# Patient Record
Sex: MALE | Race: WHITE | HISPANIC OR LATINO
[De-identification: names, ages, dates, MRNs, and addresses within clinical notes are randomized per-mention and may not be internally consistent; named-entity substitution may affect disease eponyms.]

---

## 2018-05-18 PROBLEM — Z00.00 ENCOUNTER FOR PREVENTIVE HEALTH EXAMINATION: Status: ACTIVE | Noted: 2018-05-18

## 2018-06-19 ENCOUNTER — APPOINTMENT (OUTPATIENT)
Dept: CARDIOLOGY | Facility: CLINIC | Age: 63
End: 2018-06-19
Payer: COMMERCIAL

## 2018-06-19 ENCOUNTER — NON-APPOINTMENT (OUTPATIENT)
Age: 63
End: 2018-06-19

## 2018-06-19 VITALS
DIASTOLIC BLOOD PRESSURE: 92 MMHG | HEIGHT: 74 IN | HEART RATE: 76 BPM | OXYGEN SATURATION: 95 % | RESPIRATION RATE: 16 BRPM | SYSTOLIC BLOOD PRESSURE: 149 MMHG | BODY MASS INDEX: 29.26 KG/M2 | WEIGHT: 228 LBS

## 2018-06-19 VITALS — DIASTOLIC BLOOD PRESSURE: 85 MMHG | SYSTOLIC BLOOD PRESSURE: 136 MMHG

## 2018-06-19 DIAGNOSIS — Z87.828 PERSONAL HISTORY OF OTHER (HEALED) PHYSICAL INJURY AND TRAUMA: ICD-10-CM

## 2018-06-19 DIAGNOSIS — R53.82 CHRONIC FATIGUE, UNSPECIFIED: ICD-10-CM

## 2018-06-19 DIAGNOSIS — R73.01 IMPAIRED FASTING GLUCOSE: ICD-10-CM

## 2018-06-19 DIAGNOSIS — I45.10 UNSPECIFIED RIGHT BUNDLE-BRANCH BLOCK: ICD-10-CM

## 2018-06-19 DIAGNOSIS — E78.5 HYPERLIPIDEMIA, UNSPECIFIED: ICD-10-CM

## 2018-06-19 DIAGNOSIS — I10 ESSENTIAL (PRIMARY) HYPERTENSION: ICD-10-CM

## 2018-06-19 DIAGNOSIS — Z87.891 PERSONAL HISTORY OF NICOTINE DEPENDENCE: ICD-10-CM

## 2018-06-19 DIAGNOSIS — Z80.0 FAMILY HISTORY OF MALIGNANT NEOPLASM OF DIGESTIVE ORGANS: ICD-10-CM

## 2018-06-19 PROCEDURE — 93306 TTE W/DOPPLER COMPLETE: CPT

## 2018-06-19 PROCEDURE — 99205 OFFICE O/P NEW HI 60 MIN: CPT

## 2018-06-19 PROCEDURE — 93000 ELECTROCARDIOGRAM COMPLETE: CPT | Mod: 25

## 2018-06-28 ENCOUNTER — APPOINTMENT (OUTPATIENT)
Dept: CARDIOLOGY | Facility: CLINIC | Age: 63
End: 2018-06-28
Payer: COMMERCIAL

## 2018-06-28 PROCEDURE — 93015 CV STRESS TEST SUPVJ I&R: CPT

## 2018-06-28 PROCEDURE — A9500: CPT

## 2018-06-28 PROCEDURE — 78452 HT MUSCLE IMAGE SPECT MULT: CPT

## 2018-07-23 ENCOUNTER — APPOINTMENT (OUTPATIENT)
Dept: CARDIOLOGY | Facility: CLINIC | Age: 63
End: 2018-07-23

## 2018-11-12 ENCOUNTER — RX RENEWAL (OUTPATIENT)
Age: 63
End: 2018-11-12

## 2019-02-25 ENCOUNTER — RX RENEWAL (OUTPATIENT)
Age: 64
End: 2019-02-25

## 2019-07-08 ENCOUNTER — RX RENEWAL (OUTPATIENT)
Age: 64
End: 2019-07-08

## 2019-09-23 NOTE — REASON FOR VISIT
[New Patient Visit] : a new patient visit [Referred By: _________] : Patient was referred by MADELIN [Spouse] : spouse

## 2019-09-27 ENCOUNTER — APPOINTMENT (OUTPATIENT)
Dept: NEUROSURGERY | Facility: CLINIC | Age: 64
End: 2019-09-27
Payer: COMMERCIAL

## 2019-09-27 VITALS
HEIGHT: 74 IN | WEIGHT: 230 LBS | RESPIRATION RATE: 18 BRPM | OXYGEN SATURATION: 99 % | SYSTOLIC BLOOD PRESSURE: 136 MMHG | BODY MASS INDEX: 29.52 KG/M2 | HEART RATE: 55 BPM | TEMPERATURE: 97.9 F | DIASTOLIC BLOOD PRESSURE: 86 MMHG

## 2019-09-27 PROCEDURE — 99204 OFFICE O/P NEW MOD 45 MIN: CPT

## 2019-09-27 RX ORDER — ROSUVASTATIN CALCIUM 5 MG/1
5 TABLET, FILM COATED ORAL
Qty: 30 | Refills: 3 | Status: DISCONTINUED | COMMUNITY
Start: 2018-06-19 | End: 2019-09-27

## 2019-09-27 RX ORDER — ROSUVASTATIN CALCIUM 5 MG/1
5 TABLET, FILM COATED ORAL
Qty: 30 | Refills: 0 | Status: DISCONTINUED | COMMUNITY
Start: 2018-06-19 | End: 2019-09-27

## 2019-09-27 NOTE — PHYSICAL EXAM
[General Appearance - Alert] : alert [General Appearance - In No Acute Distress] : in no acute distress [Person] : oriented to person [Place] : oriented to place [Time] : oriented to time [Cranial Nerves Optic (II)] : visual acuity intact bilaterally,  pupils equal round and reactive to light [Cranial Nerves Oculomotor (III)] : extraocular motion intact [Cranial Nerves Trigeminal (V)] : facial sensation intact symmetrically [Cranial Nerves Facial (VII)] : face symmetrical [Cranial Nerves Vestibulocochlear (VIII)] : hearing was intact bilaterally [Cranial Nerves Glossopharyngeal (IX)] : tongue and palate midline [Cranial Nerves Accessory (XI - Cranial And Spinal)] : head turning and shoulder shrug symmetric [Cranial Nerves Hypoglossal (XII)] : there was no tongue deviation with protrusion [Motor Tone] : muscle tone was normal in all four extremities [Motor Strength] : muscle strength was normal in all four extremities [Motor Handedness Right-Handed] : the patient is right hand dominant [Sensation Tactile Decrease] : light touch was intact [Balance] : balance was intact [Intact] : all sensory within normal limits bilaterally [Extraocular Movements] : extraocular movements were intact [Neck Appearance] : the appearance of the neck was normal [] : no respiratory distress [Abnormal Walk] : normal gait [Skin Color & Pigmentation] : normal skin color and pigmentation [Sclera] : the sclera and conjunctiva were normal [Full Visual Field] : full visual field

## 2019-09-27 NOTE — HISTORY OF PRESENT ILLNESS
[FreeTextEntry1] : left face numbness and speech difficulty [de-identified] : 64 year old male who had episode of left facial numbness followed by slowing of his speech on 7/16/2019. He denies word finding difficulty or difficulty understanding speech during this time. He had been in his usual state of health prior to this event and denies any prior neurologic events. He has been undergoing workup for this event and had an MRI which demonstrated a soft tissue mass within his fourth ventricle for which he presents today. \par He complains of activity intolerance and fatigue which has been going on for the last 5-6 years. He is unable to walk for long periods of time. He can walk 2-3 blocks without feeling leg fatigue. He is unsure about the ability to walk stairs. He requires frequent periods of rest.\par He was on steroids in April for a sinus infection which he tells me significantly improved his leg complaints. \par He denies Denies headache, nausea/vomiting, numbness/tingling in extremities, abnormal movement of extremities.\par

## 2019-09-27 NOTE — ASSESSMENT
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 The differential diagnoses include ependymoma vs choroid plexus papilloma vs drop metastases vs. MS vs. sarcoid.    I had a long discussion with the patient regarding the role of workup for demyelinating/autoimnune/inflammatory disease.  The patient was extensively educated about the nature of his disease process. Therapeutic and diagnostic tests include LP for MS/neurosarcoid workup .I don’t see any contraindication to a lumbar puncture. The patient should see Dr. Lima Kim. I would recommend follow up MRI in 3 months.  I will see the patient back in December 2019. I have explained the alternatives, risks and benefits to the patient and he understands and agrees to proceed.  This risk of the procedure including but not limited to pain, infection, seizure, stroke, recurrence, residual disease, neurovascular injury, heart attack, pulmonary embolism, blindness, weakness, paralysis and death have been carefully explained to the patient who clearly understands and agrees to proceed.\par

## 2019-09-27 NOTE — DATA REVIEWED
[de-identified] : I have reviewed the most recent MRI which shows a calcified mass within the fourth ventricle without evidence of obstructive hydrocephalus.Questionable white matter changes suggestive of microvascular ischemic changes vs demyelinating disease within the cervical, thoracic and lumbar spine. No evidence of demyelinating disease within the brain.

## 2019-10-07 DIAGNOSIS — G93.89 OTHER SPECIFIED DISORDERS OF BRAIN: ICD-10-CM

## 2019-10-21 ENCOUNTER — APPOINTMENT (OUTPATIENT)
Dept: NEUROLOGY | Facility: CLINIC | Age: 64
End: 2019-10-21
Payer: COMMERCIAL

## 2019-10-21 VITALS
HEIGHT: 74 IN | OXYGEN SATURATION: 99 % | SYSTOLIC BLOOD PRESSURE: 147 MMHG | WEIGHT: 230 LBS | BODY MASS INDEX: 29.52 KG/M2 | DIASTOLIC BLOOD PRESSURE: 87 MMHG | HEART RATE: 55 BPM

## 2019-10-21 DIAGNOSIS — G35 MULTIPLE SCLEROSIS: ICD-10-CM

## 2019-10-21 PROCEDURE — 99204 OFFICE O/P NEW MOD 45 MIN: CPT

## 2019-10-21 PROCEDURE — 99354: CPT

## 2019-10-21 RX ORDER — NEBIVOLOL HYDROCHLORIDE 10 MG/1
10 TABLET ORAL
Refills: 0 | Status: DISCONTINUED | COMMUNITY
End: 2019-10-21

## 2019-10-21 RX ORDER — ENALAPRIL MALEATE AND HYDROCHLOROTHIAZIDE 10; 25 MG/1; MG/1
10-25 TABLET ORAL
Refills: 0 | Status: DISCONTINUED | COMMUNITY
End: 2019-10-21

## 2019-10-21 NOTE — HISTORY OF PRESENT ILLNESS
[FreeTextEntry1] : Reason for consult: possible MS\par \par HPI: CLAUDINE ERICKSON is a 64 year old man \par \par 5-6y ago - began to feel "fatigued", slow deterioration in energy level. legs have become weaker gradually over the years. \par 3-4y of numbness on the L hand starting in pinky and progressing to involve most of the hand. difficulty buttoning buttons. gradual over time.\par No abrupt changes.\par Saw his PMD and was sent to multiple specialists - cardiology, etc.\par 7/2019 - had a "bad day at work", high stress, lost temper, feeling "funny" with increased fatigue and word finding difficulty, tingling on the left chin, no headaches. 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 Saw a neurosurgeon who sent pt for a C spine which showed lesions. Saw Jorge Robbins given lesions in the cord. Was eventually referred to Dr. Colon who recommended against surgery.  \par \par No prior episodes of neurological dysfunction.\par \par ROS/Current Sx:\par no BB dysfunction.\par 10 point ROS reviewed and scanned\par \par PMHX:\par HTN\par HLD\par brain tumor\par \par MEDS:\par bystolic\par simvastatin\par \par ALL: nkda\par \par SHx: no tob, occ etoh, no drugs. 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 daughter with unclear neurological dz for which she takes malaria med.\par \par O:\par \par Vitals: mildly htn, advised for him to discuss with pmd\par \par Exam:\par \par AO3.  Normally conversant.  Follows commands, names, and repeats.  Good attention.\par \par PERRL, VFF, EOMI, no nystagmus, face symmetric, TUP at midline.\par \par Motor: \par                                                 R:                               L:\par Del                                           5                                5\par Bi                                              5                               5\par Tri                                            5                               5\par Wrist Extensors                      5                               5\par Finger abductors                    5                               5-\par                                         5                               5- \par \par HF                                         4++                               4+\par KE                                           5                               5\par KF                                           5                               5-\par DF                                           5                               5\par PF                                           5                               5-\par \par Tone                                       R                               L\par UE                                          0                                0 \par LE                                          0                                0\par \par Sensory                                RUE                      LUE                 RLE                LLE     \par LT                                           +                            +                      +                   +\par Vib                                          +                            +                     mod              mod\par JPS                                         +                            +                      +                   +\par PP                                         +                            dec in all fingers of L hand    +                   +\par Temp                                     +                            +                      +                   +\par \par Reflexes:\par                                              R                             L                            \par Biceps                                  2                             2\par BR                                        2                             2\par Triceps                                2                              2\par Pat                                        3+                          3+\par AJ                                        3+                          3+\par \par TOES                                  E                E\par \par \par Coordination:\par                                              R                             L                       \par FTN                                       0                             0 \par KETURAH                                      0                            0\par HTS                                      0                             0 \par \par Other                                                                          \par  \par Gait: mild L circumduction that is more prominent upon fast walk, trouble with L heel elevation.\par \par                     Assistance: none\par \par 9/2019\par VitD 17\par quantiferon neg\par MIA neg\par ANCA neg\par NMO neg\par MOG neg\par \par \par MRI brain, C, T 8/2019 - 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 brain notable for typical appearing L superior frontal KELECHI lesion, large R parietal curvilinear KELECHI lesion, and at least one typical appearing PV lesion. multiple typical appearing t2h in cord. 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

## 2019-10-22 LAB
ALBUMIN SERPL ELPH-MCNC: 4.7 G/DL
ALP BLD-CCNC: 64 U/L
ALT SERPL-CCNC: 24 U/L
ANION GAP SERPL CALC-SCNC: 12 MMOL/L
APTT BLD: 31.8 SEC
AST SERPL-CCNC: 19 U/L
BASOPHILS # BLD AUTO: 0.02 K/UL
BASOPHILS NFR BLD AUTO: 0.3 %
BILIRUB SERPL-MCNC: 1.8 MG/DL
BUN SERPL-MCNC: 14 MG/DL
CALCIUM SERPL-MCNC: 9.8 MG/DL
CHLORIDE SERPL-SCNC: 101 MMOL/L
CO2 SERPL-SCNC: 26 MMOL/L
CREAT SERPL-MCNC: 1 MG/DL
EOSINOPHIL # BLD AUTO: 0.19 K/UL
EOSINOPHIL NFR BLD AUTO: 2.5 %
GLUCOSE SERPL-MCNC: 93 MG/DL
HCT VFR BLD CALC: 49.5 %
HGB BLD-MCNC: 16.7 G/DL
IMM GRANULOCYTES NFR BLD AUTO: 0.1 %
INR PPP: 1.08 RATIO
LYMPHOCYTES # BLD AUTO: 1.34 K/UL
LYMPHOCYTES NFR BLD AUTO: 17.3 %
MAN DIFF?: NORMAL
MCHC RBC-ENTMCNC: 31.8 PG
MCHC RBC-ENTMCNC: 33.7 GM/DL
MCV RBC AUTO: 94.3 FL
MONOCYTES # BLD AUTO: 0.41 K/UL
MONOCYTES NFR BLD AUTO: 5.3 %
NEUTROPHILS # BLD AUTO: 5.78 K/UL
NEUTROPHILS NFR BLD AUTO: 74.5 %
PLATELET # BLD AUTO: 214 K/UL
POTASSIUM SERPL-SCNC: 4.5 MMOL/L
PROT SERPL-MCNC: 6.9 G/DL
PT BLD: 12.4 SEC
RBC # BLD: 5.25 M/UL
RBC # FLD: 13 %
SODIUM SERPL-SCNC: 139 MMOL/L
WBC # FLD AUTO: 7.75 K/UL

## 2019-10-23 ENCOUNTER — MEDICATION RENEWAL (OUTPATIENT)
Age: 64
End: 2019-10-23

## 2019-10-29 ENCOUNTER — APPOINTMENT (OUTPATIENT)
Dept: CARDIOLOGY | Facility: CLINIC | Age: 64
End: 2019-10-29

## 2019-10-29 ENCOUNTER — CHART COPY (OUTPATIENT)
Age: 64
End: 2019-10-29

## 2019-10-31 ENCOUNTER — FORM ENCOUNTER (OUTPATIENT)
Age: 64
End: 2019-10-31

## 2019-11-01 ENCOUNTER — OUTPATIENT (OUTPATIENT)
Dept: OUTPATIENT SERVICES | Facility: HOSPITAL | Age: 64
LOS: 1 days | End: 2019-11-01
Payer: COMMERCIAL

## 2019-11-01 ENCOUNTER — APPOINTMENT (OUTPATIENT)
Dept: RADIOLOGY | Facility: HOSPITAL | Age: 64
End: 2019-11-01
Payer: COMMERCIAL

## 2019-11-01 ENCOUNTER — RESULT REVIEW (OUTPATIENT)
Age: 64
End: 2019-11-01

## 2019-11-01 DIAGNOSIS — G35 MULTIPLE SCLEROSIS: ICD-10-CM

## 2019-11-01 PROCEDURE — 62270 DX LMBR SPI PNXR: CPT

## 2019-11-01 PROCEDURE — 86592 SYPHILIS TEST NON-TREP QUAL: CPT

## 2019-11-01 PROCEDURE — 87205 SMEAR GRAM STAIN: CPT

## 2019-11-01 PROCEDURE — 88108 CYTOPATH CONCENTRATE TECH: CPT

## 2019-11-01 PROCEDURE — 82945 GLUCOSE OTHER FLUID: CPT

## 2019-11-01 PROCEDURE — 84157 ASSAY OF PROTEIN OTHER: CPT

## 2019-11-01 PROCEDURE — 82164 ANGIOTENSIN I ENZYME TEST: CPT

## 2019-11-01 PROCEDURE — 88108 CYTOPATH CONCENTRATE TECH: CPT | Mod: 26,59

## 2019-11-01 PROCEDURE — 88184 FLOWCYTOMETRY/ TC 1 MARKER: CPT

## 2019-11-01 PROCEDURE — 77003 FLUOROGUIDE FOR SPINE INJECT: CPT | Mod: 26

## 2019-11-01 PROCEDURE — 84166 PROTEIN E-PHORESIS/URINE/CSF: CPT

## 2019-11-01 PROCEDURE — 88188 FLOWCYTOMETRY/READ 9-15: CPT

## 2019-11-01 PROCEDURE — 89051 BODY FLUID CELL COUNT: CPT

## 2019-11-01 PROCEDURE — 87476 LYME DIS DNA AMP PROBE: CPT

## 2019-11-01 PROCEDURE — 83916 OLIGOCLONAL BANDS: CPT

## 2019-11-01 PROCEDURE — 86617 LYME DISEASE ANTIBODY: CPT

## 2019-11-01 PROCEDURE — 88185 FLOWCYTOMETRY/TC ADD-ON: CPT

## 2019-11-01 PROCEDURE — 77003 FLUOROGUIDE FOR SPINE INJECT: CPT

## 2019-11-15 ENCOUNTER — CHART COPY (OUTPATIENT)
Age: 64
End: 2019-11-15

## 2019-11-15 ENCOUNTER — TRANSCRIPTION ENCOUNTER (OUTPATIENT)
Age: 64
End: 2019-11-15

## 2019-11-26 ENCOUNTER — MOBILE ON CALL (OUTPATIENT)
Age: 64
End: 2019-11-26

## 2019-12-02 ENCOUNTER — APPOINTMENT (OUTPATIENT)
Dept: NEUROLOGY | Facility: CLINIC | Age: 64
End: 2019-12-02

## 2019-12-03 ENCOUNTER — APPOINTMENT (OUTPATIENT)
Dept: NEUROLOGY | Facility: CLINIC | Age: 64
End: 2019-12-03

## 2019-12-04 ENCOUNTER — APPOINTMENT (OUTPATIENT)
Dept: NEUROLOGY | Facility: CLINIC | Age: 64
End: 2019-12-04

## 2019-12-05 ENCOUNTER — APPOINTMENT (OUTPATIENT)
Dept: NEUROLOGY | Facility: CLINIC | Age: 64
End: 2019-12-05

## 2019-12-06 ENCOUNTER — APPOINTMENT (OUTPATIENT)
Dept: NEUROLOGY | Facility: CLINIC | Age: 64
End: 2019-12-06

## 2019-12-11 ENCOUNTER — APPOINTMENT (OUTPATIENT)
Dept: NEUROLOGY | Facility: CLINIC | Age: 64
End: 2019-12-11
Payer: COMMERCIAL

## 2019-12-11 VITALS
BODY MASS INDEX: 29.52 KG/M2 | HEART RATE: 55 BPM | SYSTOLIC BLOOD PRESSURE: 152 MMHG | DIASTOLIC BLOOD PRESSURE: 90 MMHG | HEIGHT: 74 IN | WEIGHT: 230 LBS

## 2019-12-11 PROCEDURE — 99215 OFFICE O/P EST HI 40 MIN: CPT

## 2019-12-11 PROCEDURE — 99354: CPT

## 2019-12-11 RX ORDER — VITAMIN K2 90 MCG
125 MCG CAPSULE ORAL
Qty: 30 | Refills: 5 | Status: ACTIVE | COMMUNITY
Start: 2019-12-11 | End: 1900-01-01

## 2019-12-11 NOTE — HISTORY OF PRESENT ILLNESS
[FreeTextEntry1] : Initial hx 10/2019\par 5-6y ago - began to feel "fatigued", slow deterioration in energy level. legs have become weaker gradually over the years. \par 3-4y of numbness on the L hand starting in pinky and progressing to involve most of the hand. difficulty buttoning buttons. gradual over time.\par No abrupt changes.\par Saw his PMD and was sent to multiple specialists - cardiology, etc.\par 7/2019 - had a "bad day at work", high stress, lost temper, feeling "funny" with increased fatigue and word finding difficulty, tingling on the left chin, no headaches. 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 Saw a neurosurgeon who sent pt for a C spine which showed lesions. Saw Jorge Robbins given lesions in the cord. Was eventually referred to Dr. Colon who recommended against surgery. \par No prior episodes of neurological dysfunction.\par \par Subj interval:\par \par Pt has been stable.\par \par Pt was recommended for 5d of IVMP but opted against it.\par \par ROS/Current Sx:\par no BB dysfunction.\par \par PMHX:\par HTN\par HLD\par brain tumor\par \par MEDS:\par bystolic\par simvastatin\par \par FHx: no neuro, no AI. daughter with unclear neurological dz for which she takes malaria med.\par \par O:\par \par 9/2019\par VitD 17\par quantiferon neg\par MIA neg\par ANCA neg\par NMO neg\par MOG neg\par \par \par LP 11/2019\par +igg synth\par igg index wnl\par OCBs absent\par WBC 1\par RBC 3\par prot 86\par gluc 59\par negative for malig cells\par VDRL neg\par lyme ab neg\par \par MRI brain, C, T 8/2019 - 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 brain notable for typical appearing L superior frontal KELECHI lesion, large R parietal curvilinear KELECHI lesion, and at least one typical appearing PV lesion. multiple typical appearing t2h in cord. 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 routine labs ok and quant gold neg in 9/2019. check hepB. Patient wants to hold off on starting ocrevus until after upcoming dental work is done, which I think is reasonable. I also recommended seeing his PMD for age appropriate vaccinations prior to starting ocrevus.\par - vitD3 5ku daily\par \par Sx:\par - start ampyra for symptomatic rx. cr is normal as of 10/21/19.\par \par RTC 4-6wks after starting ocrevus.\par \par Discussed dx and treatment plan at length with pt and wife.

## 2019-12-30 ENCOUNTER — MEDICATION RENEWAL (OUTPATIENT)
Age: 64
End: 2019-12-30

## 2020-01-18 LAB
HBV CORE IGG+IGM SER QL: NONREACTIVE
HBV CORE IGM SER QL: NONREACTIVE
HBV SURFACE AB SER QL: NONREACTIVE
HBV SURFACE AB SERPL IA-ACNC: <3 MIU/ML
HBV SURFACE AG SER QL: NONREACTIVE

## 2020-01-25 ENCOUNTER — RX RENEWAL (OUTPATIENT)
Age: 65
End: 2020-01-25

## 2020-04-30 ENCOUNTER — RX RENEWAL (OUTPATIENT)
Age: 65
End: 2020-04-30

## 2020-05-14 ENCOUNTER — RX RENEWAL (OUTPATIENT)
Age: 65
End: 2020-05-14

## 2020-07-02 ENCOUNTER — APPOINTMENT (OUTPATIENT)
Dept: NEUROLOGY | Facility: CLINIC | Age: 65
End: 2020-07-02

## 2020-07-02 NOTE — HISTORY OF PRESENT ILLNESS
[Home] : at home, [unfilled] , at the time of the visit. [Medical Office: ([facility])___] : at the medical office located in  [Verbal consent obtained from patient] : the patient, [unfilled] [FreeTextEntry1] : Initial hx 10/2019\par 5-6y ago - began to feel "fatigued", slow deterioration in energy level. legs have become weaker gradually over the years. \par 3-4y of numbness on the L hand starting in pinky and progressing to involve most of the hand. difficulty buttoning buttons. gradual over time.\par No abrupt changes.\par Saw his PMD and was sent to multiple specialists - cardiology, etc.\par 7/2019 - had a "bad day at work", high stress, lost temper, feeling "funny" with increased fatigue and word finding difficulty, tingling on the left chin, no headaches. 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 Saw a neurosurgeon who sent pt for a C spine which showed lesions. Saw Jorge Robbins given lesions in the cord. Was eventually referred to Dr. Colon who recommended against surgery. \par No prior episodes of neurological dysfunction.\par Was planned to start ocrevus but deferred due to covid.\par Started ampyra in 2020.\par Saw Dr. Moreno for 2nd opinion.\par \par Subj interval:\par \par Pt did not answer phone call or invite for video.\par

## 2020-07-07 ENCOUNTER — APPOINTMENT (OUTPATIENT)
Dept: NEUROLOGY | Facility: CLINIC | Age: 65
End: 2020-07-07
Payer: MEDICARE

## 2020-07-07 PROCEDURE — 99214 OFFICE O/P EST MOD 30 MIN: CPT | Mod: 95

## 2020-07-07 NOTE — HISTORY OF PRESENT ILLNESS
[Home] : at home, [unfilled] , at the time of the visit. [Medical Office: ([facility])___] : at the medical office located in  [Verbal consent obtained from patient] : the patient, [unfilled] [FreeTextEntry1] : Initial hx 10/2019\par 5-6y ago - began to feel "fatigued", slow deterioration in energy level. legs have become weaker gradually over the years. \par 3-4y of numbness on the L hand starting in pinky and progressing to involve most of the hand. difficulty buttoning buttons. gradual over time.\par No abrupt changes.\par Saw his PMD and was sent to multiple specialists - cardiology, etc.\par 7/2019 - had a "bad day at work", high stress, lost temper, feeling "funny" with increased fatigue and word finding difficulty, tingling on the left chin, no headaches. 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 Saw a neurosurgeon who sent pt for a C spine which showed lesions. Saw Jorge Robbins given lesions in the cord. Was eventually referred to Dr. Colon who recommended against surgery. \par No prior episodes of neurological dysfunction.\par Planned to start ocrevus for PPMS but delayed due to covid crisis.\par Started ampyra in early 2020, notes some benefit.\par Has been on wahls protocol for several months in 2020.\par \par Subj interval:\par \par Pt feels like he has worsened over past half-year. Feels like he has fatigue towards the later part of the day. Started on modafinil 100mg daily, recommended by Dr. Moreno, which helps a bit.\par \par Has seen Dr. Moreno for 2nd opinion, who suggested copaxone as a DMT given covid crisis.\par \par Started ampyra in early 2020, notes some benefit.\par \par ROS/Current Sx:\par no BB dysfunction.\par \par PMHX:\par HTN\par HLD\par brain tumor\par \par MEDS:\par bystolic\par simvastatin\par ampyra\par modafinil\par \par FHx: no neuro, no AI. daughter with unclear neurological dz for which she takes malaria med.\par \par O:\par \par 9/2019\par VitD 17\par quantiferon neg\par MIA neg\par ANCA neg\par NMO neg\par MOG neg\par \par \par LP 11/2019\par +igg synth\par igg index wnl\par OCBs absent\par WBC 1\par RBC 3\par prot 86\par gluc 59\par negative for malig cells\par VDRL neg\par lyme ab neg\par \par \par MRI brain, C, T 8/2019 - 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 brain notable for typical appearing L superior frontal KELECHI lesion, large R parietal curvilinear KELECHI lesion, and at least one typical appearing PV lesion. multiple typical appearing t2h in cord. 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 Pt wants to hold off on ocrevus given covid crisis. We discussed that it would not be wrong to start ocrevus at this time, but it all depends on his level of risk tolerance. Pt will likely hold off on DMTs at this time.\par \par Dz mod:\par - vitD3 5ku daily\par \par Sx:\par - cont ampyra for symptomatic rx. cr is normal as of 10/21/19.\par - cont modafinil 100mg. counseled on increasing to bid,\par \par RTC 3m in person\par \par Discussed dx and treatment plan at length with pt and wife. Education provided. All questions answered.

## 2020-10-12 ENCOUNTER — APPOINTMENT (OUTPATIENT)
Dept: NEUROLOGY | Facility: CLINIC | Age: 65
End: 2020-10-12

## 2020-11-09 ENCOUNTER — APPOINTMENT (OUTPATIENT)
Dept: NEUROLOGY | Facility: CLINIC | Age: 65
End: 2020-11-09
Payer: MEDICARE

## 2020-11-09 VITALS
WEIGHT: 233 LBS | HEIGHT: 74 IN | DIASTOLIC BLOOD PRESSURE: 89 MMHG | BODY MASS INDEX: 29.9 KG/M2 | SYSTOLIC BLOOD PRESSURE: 157 MMHG | HEART RATE: 61 BPM

## 2020-11-09 VITALS — TEMPERATURE: 96.7 F

## 2020-11-09 PROCEDURE — 99215 OFFICE O/P EST HI 40 MIN: CPT

## 2020-11-09 NOTE — HISTORY OF PRESENT ILLNESS
[FreeTextEntry1] : Initial hx 10/2019\par 5-6y ago - began to feel "fatigued", slow deterioration in energy level. legs have become weaker gradually over the years. \par 3-4y of numbness on the L hand starting in pinky and progressing to involve most of the hand. difficulty buttoning buttons. gradual over time.\par No abrupt changes.\par Saw his PMD and was sent to multiple specialists - cardiology, etc.\par 7/2019 - had a "bad day at work", high stress, lost temper, feeling "funny" with increased fatigue and word finding difficulty, tingling on the left chin, no headaches. 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 Saw a neurosurgeon who sent pt for a C spine which showed lesions. Saw Jorge Robbins given lesions in the cord. Was eventually referred to Dr. Colon who recommended against surgery. \par No prior episodes of neurological dysfunction.\par Planned to start ocrevus for PPMS but delayed due to covid crisis.\par Started ampyra in early 2020, notes some benefit.\par Has been on wahls protocol for several months in 2020.\par 9/2020 - Had R hip burning (where pocket would be), lasted 1-2d.\par L hand more numb than it was 1y ago.\par Continued worsening overall in 2020.\par 11/2020 - Walks 1/2 block before RLE gets tired, but has been able to walk 1/5 mile in early 2020.\par \par Subj interval:\par \par Pt feels like he has worsened over past year, RLE slightly weaker and L hand slightly more numb. \par \par Walks 1/2 block before RLE gets tired, but has been able to walk 1/5 mile in early 2020.\par \par Started on modafinil 100mg daily, recommended by Dr. Moreno, but doesn't take it often.\par \par Started ampyra in early 2020, notes some benefit. \par \par ROS/Current Sx:\par no BB dysfunction.\par otherwise neg or as per hpi\par \par PMHX:\par HTN\par HLD\par benign brain tumor\par \par MEDS:\par bystolic\par simvastatin\par ampyra\par modafinil prn\par \par FHx: no neuro, no AI. daughter with unclear neurological dz for which she takes malaria med.\par \par O:\par \par Exam:\par \par AO3. Normally conversant. Follows commands, names, and repeats. Good attention.\par \par PERRL, VFF, EOMI, no nystagmus, face symmetric, TUP at midline.\par \par Motor: \par    R:  L:\par Del   5  5\par Bi   5  5\par Tri   5  5\par Wrist Extensors  5  5\par Finger abductors  5  5-\par    5  5- \par \par HF   4+  4\par KE   5  5\par KF   5  5-\par DF   5  5\par PF   5  5\par \par Tone   R  L\par UE   0  0 \par LE   0  0\par \par Sensory  RUE  LUE  RLE LLE \par LT   +  +  +  +\par Vib   +  +  mod mod\par JPS   +  +  +  +\par PP   +  dec in all fingers of L hand +  +\par Temp   +  +  +  +\par \par Reflexes:\par    R  L  \par Biceps   2  2\par BR   2  2\par Triceps  2  2\par Pat   3+  3+\par AJ   3+  3+\par \par TOES   E E\par \par Coordination:\par    R  L  \par FTN   0  0 \par KETURAH   0  0\par HTS   0  0 \par \par Other     \par  \par Gait: mild L circumduction that is more prominent upon fast walk,\par \par   Assistance: none\par \par t25w\par 5.8, 6.0 in 11/2020\par \par 9/2019\par VitD 17\par quantiferon neg\par MIA neg\par ANCA neg\par NMO neg\par MOG neg\par \par \par LP 11/2019\par +igg synth\par igg index wnl\par OCBs absent\par WBC 1\par RBC 3\par prot 86\par gluc 59\par negative for malig cells\par VDRL neg\par lyme ab neg\par \par \par MRI brain, C, T 8/2019 - 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 brain notable for typical appearing L superior frontal KELECHI lesion, large R parietal curvilinear KELECHI lesion, and at least one typical appearing PV lesion. multiple typical appearing t2h in cord. 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 Education provided. All questions answered. \par \par Dz mod:\par - vitD3 5ku daily\par - again discussed risk/benefit of ocrevus as it related to covid/infections in general. pt would like to hold off at this time which is not unreasonable, and we will reassess in 6m.\par - new blood work\par \par Sx:\par - cont ampyra for symptomatic rx. cr is normal as of 10/21/19.\par - cont modafinil 100mg prn. counseled on increasing to bid,\par \par RTC 6m\par \par

## 2020-12-09 ENCOUNTER — RESULT REVIEW (OUTPATIENT)
Age: 65
End: 2020-12-09

## 2021-05-17 ENCOUNTER — APPOINTMENT (OUTPATIENT)
Dept: NEUROLOGY | Facility: CLINIC | Age: 66
End: 2021-05-17

## 2021-09-10 ENCOUNTER — RX RENEWAL (OUTPATIENT)
Age: 66
End: 2021-09-10

## 2021-09-10 ENCOUNTER — APPOINTMENT (OUTPATIENT)
Dept: NEUROLOGY | Facility: CLINIC | Age: 66
End: 2021-09-10

## 2021-10-06 PROBLEM — I10 ESSENTIAL HYPERTENSION: Status: ACTIVE | Noted: 2018-06-19

## 2022-02-10 ENCOUNTER — RX RENEWAL (OUTPATIENT)
Age: 67
End: 2022-02-10

## 2022-04-08 ENCOUNTER — RESULT REVIEW (OUTPATIENT)
Age: 67
End: 2022-04-08

## 2022-04-12 ENCOUNTER — FORM ENCOUNTER (OUTPATIENT)
Age: 67
End: 2022-04-12

## 2022-04-14 ENCOUNTER — FORM ENCOUNTER (OUTPATIENT)
Age: 67
End: 2022-04-14

## 2022-05-02 ENCOUNTER — APPOINTMENT (OUTPATIENT)
Dept: NEUROLOGY | Facility: CLINIC | Age: 67
End: 2022-05-02
Payer: MEDICARE

## 2022-05-02 VITALS
DIASTOLIC BLOOD PRESSURE: 92 MMHG | HEART RATE: 81 BPM | SYSTOLIC BLOOD PRESSURE: 168 MMHG | WEIGHT: 215 LBS | TEMPERATURE: 97.9 F | OXYGEN SATURATION: 98 % | HEIGHT: 74 IN | BODY MASS INDEX: 27.59 KG/M2

## 2022-05-02 DIAGNOSIS — D75.839 THROMBOCYTOSIS, UNSPECIFIED: ICD-10-CM

## 2022-05-02 DIAGNOSIS — G45.9 TRANSIENT CEREBRAL ISCHEMIC ATTACK, UNSPECIFIED: ICD-10-CM

## 2022-05-02 PROCEDURE — G2212 PROLONG OUTPT/OFFICE VIS: CPT

## 2022-05-02 PROCEDURE — 99215 OFFICE O/P EST HI 40 MIN: CPT

## 2022-05-02 NOTE — HISTORY OF PRESENT ILLNESS
[FreeTextEntry1] : Initial hx 10/2019\par 5-6y ago - began to feel "fatigued", slow deterioration in energy level. legs have become weaker gradually over the years. \par 3-4y of numbness on the L hand starting in pinky and progressing to involve most of the hand. difficulty buttoning buttons. gradual over time.\par No abrupt changes.\par Saw his PMD and was sent to multiple specialists - cardiology, etc.\par 7/2019 - had a "bad day at work", high stress, lost temper, feeling "funny" with increased fatigue and word finding difficulty, tingling on the left chin, no headaches. 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 Saw a neurosurgeon who sent pt for a C spine which showed lesions. Saw Jorge Robbins given lesions in the cord. Was eventually referred to Dr. Colon who recommended against surgery. \par No prior episodes of neurological dysfunction.\par Planned to start ocrevus for PPMS but delayed due to covid crisis.\par Started ampyra in early 2020, notes some benefit.\par Has been on wahls protocol for several months in 2020.\par 9/2020 - Had R hip burning (where pocket would be), lasted 1-2d.\par L hand more numb than it was 1y ago.\par Continued worsening overall in 2020.\par 11/2020 - Walks 1/2 block before RLE gets tired, but has been able to walk 1/5 mile in early 2020.\par Was lost to follow up from 11/2020 to 5/2022.\par Had been briefly on modafinil 100mg prn but didn't help.\par \par \par Subj interval:\par \par Reports continued worsening of RLE.\par \par Pt feels like he has worsened over past year, RLE slightly weaker and L hand slightly more numb. \par \par Around the house, walks on his own. Otherwise holds on to his wife.\par \par Continues dalfampridine since early 2020, notes some benefit. \par \par Lost >35lbs over past 2 years.\par \par Got up quickly after eating, then felt dizzy with diplopia, no trouble speaking, blood pressure was elevated (180 systolics per wife), pulse was also high (90). No hand incoordination. \par \par \par ROS/Current Sx:\par no BB dysfunction.\par gait dysfunction\par RLE weakness\par \par PMHX:\par HTN\par HLD\par benign brain tumor\par \par MEDS:\par bystolic\par simvastatin\par ampyra\par LDN\par vitD3 10ku daily\par vitC\par zinc\par \par \par FHx: no neuro, no AI. daughter with unclear neurological dz for which she takes malaria med.\par \par O:\par \par Exam:\par \par AO3. Normally conversant. Follows commands, names, and repeats. Good attention.\par \par PERRL, VFF, EOMI, no nystagmus, face symmetric, TUP at midline.\par \par Motor: \par  R: L:\par Del 5 5\par Bi 5 5\par Tri 5 5\par Wrist Extensors 5 5\par Finger abductors 5 5\par  5 5- \par \par HF 4+ 4-\par KE 5 5\par KF 5 4+\par DF 5 5-\par PF 5 5\par \par Tone R L\par UE 0 0 \par LE 0 0\par \par Sensory RUE LUE RLE LLE \par LT + + + +\par Vib + + mod mod\par JPS + + + +\par PP + dec in all fingers of L hand + +\par Temp + + + +\par \par Reflexes:\par  R L \par Biceps 2 2\par BR 2 2\par Triceps 2 2\par Pat 3+ 3+\par AJ 3+ 3+\par \par TOES E E\par \par Coordination:\par  R L \par FTN 0 0 \par KETURAH 0 0\par HTS 0 0 \par \par Other \par  \par Gait: mild L circumduction that is more prominent upon fast walk.\par \par  Assistance: none\par \par t25w\par 5.8, 6.0 in 11/2020\par 5.8, 5.6 in 4/2022\par \par \par LP 11/2019\par +igg synth\par igg index wnl\par OCBs absent\par WBC 1\par RBC 3\par prot 86\par gluc 59\par negative for malig cells\par VDRL neg\par lyme ab neg\par \par \par MRI brain, C, T 8/2019 - 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 brain notable for typical appearing L superior frontal KELECHI lesion, large R parietal curvilinear KELECHI lesion, and at least one typical appearing PV lesion. multiple typical appearing t2h in cord. no aleyda+ lesions.\par \par MRI brain 4/2022 - stable on my review. no new lesions. 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 Education provided. All questions answered. \par \par Dz mod:\par - cont vitD3  (normal in 4/2022)\par - again discussed risk/benefit of ocrevus as it related to covid/infections in general. pt would like to hold off given pandemic.\par - MRI C+T for interval evaluation for MS\par - MRA head and TTE for ? TIA.\par - counseled on safety of covid vaccine and highly recommended that get vaccinated as he is at high risk for complications if he were to be infected with covid.\par - hematology referral for high hgb\par - will send message to Dr. Joy to review MRI\par \par Sx:\par - cont ampyra for symptomatic rx. cr is normal as of 4/2022.\par - PT\par \par RTC 6m

## 2022-05-24 ENCOUNTER — RX RENEWAL (OUTPATIENT)
Age: 67
End: 2022-05-24

## 2022-05-25 ENCOUNTER — NON-APPOINTMENT (OUTPATIENT)
Age: 67
End: 2022-05-25

## 2022-09-22 ENCOUNTER — APPOINTMENT (OUTPATIENT)
Dept: NEUROSURGERY | Facility: HOSPITAL | Age: 67
End: 2022-09-22

## 2022-09-22 PROCEDURE — 99214 OFFICE O/P EST MOD 30 MIN: CPT | Mod: 95

## 2022-09-22 RX ORDER — OCRELIZUMAB 300 MG/10ML
300 INJECTION INTRAVENOUS
Qty: 2 | Refills: 1 | Status: DISCONTINUED | COMMUNITY
Start: 2019-12-30 | End: 2022-09-22

## 2022-09-22 RX ORDER — SIMVASTATIN 20 MG/1
20 TABLET, FILM COATED ORAL
Qty: 90 | Refills: 0 | Status: DISCONTINUED | COMMUNITY
Start: 2018-11-26 | End: 2022-09-22

## 2022-09-22 NOTE — HISTORY OF PRESENT ILLNESS
[FreeTextEntry1] : left face numbness and speech difficulty [de-identified] : 64 year old male who had episode of left facial numbness followed by slowing of his speech on 7/16/2019. He denies word finding difficulty or difficulty understanding speech during this time. He had been in his usual state of health prior to this event and denies any prior neurologic events. He has been undergoing workup for this event and had an MRI which demonstrated a soft tissue mass within his fourth ventricle.\par \par He was referred to Dr. Per Pearce for neuroinflammatory workup

## 2022-11-22 ENCOUNTER — RX RENEWAL (OUTPATIENT)
Age: 67
End: 2022-11-22

## 2023-03-26 ENCOUNTER — FORM ENCOUNTER (OUTPATIENT)
Age: 68
End: 2023-03-26

## 2023-03-30 ENCOUNTER — NON-APPOINTMENT (OUTPATIENT)
Age: 68
End: 2023-03-30

## 2023-04-17 ENCOUNTER — APPOINTMENT (OUTPATIENT)
Dept: NEUROLOGY | Facility: CLINIC | Age: 68
End: 2023-04-17
Payer: MEDICARE

## 2023-04-17 VITALS
HEART RATE: 98 BPM | HEIGHT: 74 IN | WEIGHT: 225 LBS | BODY MASS INDEX: 28.88 KG/M2 | SYSTOLIC BLOOD PRESSURE: 140 MMHG | DIASTOLIC BLOOD PRESSURE: 92 MMHG

## 2023-04-17 PROCEDURE — 99215 OFFICE O/P EST HI 40 MIN: CPT

## 2023-04-17 NOTE — HISTORY OF PRESENT ILLNESS
[FreeTextEntry1] : Initial hx 10/2019\par 5-6y ago - began to feel "fatigued", slow deterioration in energy level. legs have become weaker gradually over the years. \par 3-4y of numbness on the L hand starting in pinky and progressing to involve most of the hand. difficulty buttoning buttons. gradual over time.\par No abrupt changes.\par Saw his PMD and was sent to multiple specialists - cardiology, etc.\par 7/2019 - had a "bad day at work", high stress, lost temper, feeling "funny" with increased fatigue and word finding difficulty, tingling on the left chin, no headaches. 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 Saw a neurosurgeon who sent pt for a C spine which showed lesions. Saw Jorge Robbins given lesions in the cord. Was eventually referred to Dr. Colon who recommended against surgery. \par No prior episodes of neurological dysfunction.\par Planned to start ocrevus for PPMS but delayed due to covid crisis.\par Started ampyra in early 2020, notes some benefit.\par Has been on wahls protocol for several months in 2020.\par 9/2020 - Had R hip burning (where pocket would be), lasted 1-2d.\par L hand more numb than it was 1y ago.\par Continued worsening overall in 2020.\par 11/2020 - Walks 1/2 block before RLE gets tired, but has been able to walk 1/5 mile in early 2020.\par Was lost to follow up from 11/2020 to 5/2022.\par Had been briefly on modafinil 100mg prn but didn't help.\par \par \par Subj interval:\par \par 2wks of swollen feet BL worse L>R. Saw cardiologist. Started diuretic which helps. TTE helps. Salt makes it worse. \par \par Pt feels like he has worsened over past year with respect to BL LE weakness. \par \par Continues dalfampridine since early 2020, notes some benefit. \par \par Around the house, walks on his own. Otherwise holds on to his wife or using a cart while food shopping.\par \par Has not been exercising on his own.\par \par \par ROS/Current Sx:\par no BB dysfunction.\par gait dysfunction\par BL LE weakness\par \par PMHX:\par HTN\par HLD\par benign brain tumor\par MS\par \par MEDS:\par triamterene/HCTZ\par ivermectin weekly\par vitC\par vitK\par zinc\par quercetin\par ampyra\par vitD3 5ku\par \par \par FHx: no neuro, no AI. daughter with unclear neurological dz for which she takes malaria med.\par \par \par O:\par \par Exam:\par \par AO3. Normally conversant. Follows commands, names, and repeats. Good attention.\par \par PERRL, VFF, EOMI, no nystagmus, face symmetric, TUP at midline.\par \par Motor: \par  R: L:\par Del 5 5\par Bi 5 5\par Tri 5 5\par Wrist Extensors 5 5\par Finger abductors 5 5\par  5 5- \par \par HF 4+ 3- \par KE 5 5\par KF 5- 4\par DF 5 4(decROM)\par PF 5 5\par \par Tone R L\par UE 0 0 \par LE 0 0\par \par Sensory RUE LUE RLE LLE \par LT + + + +\par Vib + + mod mod\par JPS + + + +\par PP + dec in all fingers of L hand + +\par Temp + + + +\par \par Reflexes:\par  R L \par Biceps 2 2\par BR 2 2\par Triceps 2 2\par Pat 3+ 3+\par AJ 3+ 3+\par \par TOES E E\par \par Coordination:\par  R L \par FTN 0 0 \par KETURAH 0 0\par HTS 0 0 \par \par Other \par  \par Gait: moderate L circumduction, somewhat unstable. 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 brain notable for typical appearing L superior frontal KELECHI lesion, large R parietal curvilinear KELECHI lesion, and at least one typical appearing PV lesion. multiple typical appearing t2h in cord. no aleyda+ lesions.\par \par MRI brain 4/2022 - stable on my review. no new lesions. 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 Education provided. All questions answered. \par \par Dz mod:\par - cont vitD3 (normal in 4/2022)\par - pt would like to hold off on DMT and opted for a natural approach. I again advised starting DMT - ocrevus.\par - again recommended hematology referral for high hgb\par \par Sx:\par - cont ampyra for symptomatic rx. cr is normal as of 4/2022.\par - PT\par - physiatry referral for consideration of walking aids, ?afo\par \par RTC 6m

## 2023-04-26 ENCOUNTER — FORM ENCOUNTER (OUTPATIENT)
Age: 68
End: 2023-04-26

## 2023-05-16 ENCOUNTER — RX RENEWAL (OUTPATIENT)
Age: 68
End: 2023-05-16

## 2023-06-02 DIAGNOSIS — M54.50 LOW BACK PAIN, UNSPECIFIED: ICD-10-CM

## 2023-06-02 RX ORDER — LIDOCAINE 5% 700 MG/1
5 PATCH TOPICAL
Qty: 30 | Refills: 3 | Status: ACTIVE | COMMUNITY
Start: 2023-06-02 | End: 1900-01-01

## 2023-11-20 ENCOUNTER — APPOINTMENT (OUTPATIENT)
Dept: NEUROLOGY | Facility: CLINIC | Age: 68
End: 2023-11-20

## 2024-01-03 ENCOUNTER — RX RENEWAL (OUTPATIENT)
Age: 69
End: 2024-01-03

## 2024-02-02 ENCOUNTER — RX RENEWAL (OUTPATIENT)
Age: 69
End: 2024-02-02

## 2024-02-02 RX ORDER — DALFAMPRIDINE 10 MG/1
10 TABLET, FILM COATED, EXTENDED RELEASE ORAL
Qty: 180 | Refills: 1 | Status: ACTIVE | COMMUNITY
Start: 2020-05-14 | End: 1900-01-01

## 2024-06-03 ENCOUNTER — APPOINTMENT (OUTPATIENT)
Dept: NEUROLOGY | Facility: CLINIC | Age: 69
End: 2024-06-03
Payer: MEDICARE

## 2024-06-03 VITALS
BODY MASS INDEX: 28.11 KG/M2 | HEART RATE: 55 BPM | HEIGHT: 74 IN | SYSTOLIC BLOOD PRESSURE: 174 MMHG | WEIGHT: 219 LBS | DIASTOLIC BLOOD PRESSURE: 96 MMHG

## 2024-06-03 PROCEDURE — 99215 OFFICE O/P EST HI 40 MIN: CPT

## 2024-06-03 PROCEDURE — G2211 COMPLEX E/M VISIT ADD ON: CPT

## 2024-06-03 RX ORDER — NEBIVOLOL HYDROCHLORIDE 5 MG/1
5 TABLET ORAL
Refills: 0 | Status: ACTIVE | COMMUNITY

## 2024-06-03 NOTE — HISTORY OF PRESENT ILLNESS
[FreeTextEntry1] : Initial hx 10/2019 5-6y ago - began to feel "fatigued", slow deterioration in energy level. legs have become weaker gradually over the years.  3-4y of numbness on the L hand starting in pinky and progressing to involve most of the hand. difficulty buttoning buttons. gradual over time. No abrupt changes. Saw his PMD and was sent to multiple specialists - cardiology, etc. 7/2019 - had a "bad day at work", high stress, lost temper, feeling "funny" with increased fatigue and word finding difficulty, tingling on the left chin, no headaches. 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 Saw a neurosurgeon who sent pt for a C spine which showed lesions. Saw Jorge Robbins given lesions in the cord. Was eventually referred to Dr. Colon who recommended against surgery.  No prior episodes of neurological dysfunction. 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 Started ampyra in early 2020, notes some benefit. Has been on wahls protocol for several months in 2020. 9/2020 - Had R hip burning (where pocket would be), lasted 1-2d. L hand more numb than it was 1y ago. Continued worsening overall in 2020. 11/2020 - Walks 1/2 block before RLE gets tired, but has been able to walk 1/5 mile in early 2020. Was lost to follow up from 11/2020 to 5/2022. Had been briefly on modafinil 100mg prn but didn't help.   Subj interval:  Has had low back pain discmfort (without kathy pain) and some sciatica symptoms down LLE  for some time. Saw ortho, got MRI L spine showing degenerative disc disease. Has been doing PT.   Pt feels like he has worsened over past year with respect to BL LE weakness.   Continues dalfampridine since early 2020, noted some benefit but now he is not sure.  Some SOB; I advised him to discuss with PMD and possibly pulm, and that this is not an MS symptoms.  Around the house, walks on his own. Otherwise holds on to his wife or using a cart while food shopping.  Has not been exercising on his own.   ROS/Current Sx: no BB dysfunction. gait dysfunction BL LE weakness  PMHX: HTN HLD benign brain tumor MS high hemoglobin - s/p pheresis, sees hematologist.  MEDS: bystolic ivermectin weekly quercetin ampyra vitD3 5ku   FHx: no neuro, no AI. daughter with unclear neurological dz for which she takes malaria med.   O:  Exam:  AO3. Normally conversant. Follows commands, names, and repeats. Good attention.  PERRL, VFF, EOMI, no nystagmus, face symmetric, TUP at midline.  Motor:   R: L: Del 5 5 Bi 5 5 Tri 5 5 Wrist Extensors 5 5 Finger abductors 5 4+ Finger extensors  5   4+  5 5-   HF 4+ 2+ KE 5 5 KF 5- 4 DF 5 4 (decROM) PF 5 5  Tone R L UE 0 0  LE 0 0  Sensory RUE LUE RLE LLE  LT + + + + Vib + + mod mod JPS + + + + PP + dec in all fingers of L hand + + Temp + + + +  Reflexes:  R L  Biceps 2 2 BR 2 2 Triceps 2 2 Pat 3+ 3+ AJ 3+ 3+  TOES E E  Coordination:  R L  FTN 0 0  KETURAH 0 0 HTS 0 0   Other    Gait: moderate L circumduction, somewhat unstable.    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 brain notable for typical appearing L superior frontal KELECHI lesion, large R parietal curvilinear KELECHI lesion, and at least one typical appearing PV lesion. multiple typical appearing t2h in cord. no aleyda+ lesions.  MRI brain 4/2022 - stable on my review. no new lesions. tumor is stable.   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  Definite progression as of 4/2023 and 5/2024.   Advised   Discussed dx and treatment plan at length with pt and wife. Education provided. All questions answered.   Dz mod: - cont vitD3 (normal in 4/2022) - pt would like to hold off on DMT and opted for a natural approach. I again advised starting DMT - ocrevus. - defer MRIs for now given not likely to   Sx: - trial of stopping ampyra temporarily to see if it's helpful.  - PT - physiatry referral for consideration of walking aids, ?afo - f/u with ortho for lumbar spondylosis.  - see vascular for swollen feet and feeling of coldness in LEs.   RTC 6m, sooner prn

## 2024-08-28 ENCOUNTER — APPOINTMENT (OUTPATIENT)
Dept: NEUROLOGY | Facility: CLINIC | Age: 69
End: 2024-08-28
Payer: MEDICARE

## 2024-08-28 DIAGNOSIS — Z79.60 LONG TERM (CURRENT) USE OF UNSPECIFIED IMMUNOMODULATORS AND IMMUNOSUPPRESSANTS: ICD-10-CM

## 2024-08-28 PROCEDURE — G2211 COMPLEX E/M VISIT ADD ON: CPT

## 2024-08-28 PROCEDURE — 99215 OFFICE O/P EST HI 40 MIN: CPT

## 2024-08-28 RX ORDER — OCRELIZUMAB 300 MG/10ML
300 INJECTION INTRAVENOUS
Qty: 2 | Refills: 1 | Status: ACTIVE | COMMUNITY
Start: 2024-08-28

## 2024-08-28 NOTE — HISTORY OF PRESENT ILLNESS
[Home] : at home, [unfilled] , at the time of the visit. [place] [place] : at the medical office located in  [Verbal consent obtained from patient] : the patient, [unfilled] [FreeTextEntry1] : Initial hx 10/2019 5-6y ago - began to feel "fatigued", slow deterioration in energy level. legs have become weaker gradually over the years.  3-4y of numbness on the L hand starting in pinky and progressing to involve most of the hand. difficulty buttoning buttons. gradual over time. No abrupt changes. Saw his PMD and was sent to multiple specialists - cardiology, etc. 7/2019 - had a "bad day at work", high stress, lost temper, feeling "funny" with increased fatigue and word finding difficulty, tingling on the left chin, no headaches. 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 Saw a neurosurgeon who sent pt for a C spine which showed lesions. Saw Jorge Robbins given lesions in the cord. Was eventually referred to Dr. Colon who recommended against surgery.  No prior episodes of neurological dysfunction. 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 Started ampyra in early 2020, notes some benefit. Has been on wahls protocol for several months in 2020. 9/2020 - Had R hip burning (where pocket would be), lasted 1-2d. L hand more numb than it was 1y ago. Continued worsening overall in 2020. 11/2020 - Walks 1/2 block before RLE gets tired, but has been able to walk 1/5 mile in early 2020. Was lost to follow up from 11/2020 to 5/2022. Had been briefly on modafinil 100mg prn but didn't help.   Subj interval:  Has had low back pain discomfort (without kathy pain) and some sciatica symptoms down LLE for some time. Saw ortho, got MRI L spine showing degenerative disc disease. Has been doing PT.   Pt feels like he has worsened over past year with respect to BL LE weakness.   Continues dalfampridine since early 2020, noted some benefit but now he is not sure.  Some SOB; I advised him to discuss with PMD and possibly pulm, and that this is not an MS symptoms.  Around the house, walks on his own. Otherwise holds on to his wife or using a cart while food shopping.  Has not been exercising on his own.   ROS/Current Sx: no BB dysfunction. gait dysfunction BL LE weakness  PMHX: HTN HLD benign brain tumor MS high hemoglobin - s/p pheresis, sees hematologist.  MEDS: bystolic ivermectin weekly quercetin ampyra vitD3 5ku   FHx: no neuro, no AI. daughter with unclear neurological dz for which she takes malaria med.   O:  Exam:  AO3. Normally conversant. Follows commands, names, and repeats. Good attention.  PERRL, VFF, EOMI, no nystagmus, face symmetric, TUP at midline.  Motor:   R: L: Del 5 5 Bi 5 5 Tri 5 5 Wrist Extensors 5 5 Finger abductors 5 4+ Finger extensors  5   4+  5 5-   HF 4+ 2+ KE 5 5 KF 5- 4 DF 5 4 (decROM) PF 5 5  Tone R L UE 0 0  LE 0 0  Sensory RUE LUE RLE LLE  LT + + + + Vib + + mod mod JPS + + + + PP + dec in all fingers of L hand + + Temp + + + +  Reflexes:  R L  Biceps 2 2 BR 2 2 Triceps 2 2 Pat 3+ 3+ AJ 3+ 3+  TOES E E  Coordination:  R L  FTN 0 0  KETURAH 0 0 HTS 0 0   Other    Gait: moderate L circumduction, somewhat unstable.    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 brain notable for typical appearing L superior frontal KELECHI lesion, large R parietal curvilinear KELECHI lesion, and at least one typical appearing PV lesion. multiple typical appearing t2h in cord. no aleyda+ lesions.  MRI brain 4/2022 - stable on my review. no new lesions. tumor is stable.   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 Definite progression as of 4/2023 and 5/2024.   Advised ocrevus use, discussed pros and cons.  Discussed full benefit risk profile of ocreuvs, including risk of infusion/injection reactions, infectious, rare serious infections, possible neoplastic risk, possible effects on vaccine response, blood effects.  Discussed dx and treatment plan at length with pt and wife. Education provided. All questions answered.   Dz mod: - pt wants to start ocrevus - cont vitD3, check level - check new blood work prior to ocrevus - defer MRIs for now given not likely to . will plan to get new MRIs after starting ocrevus  Sx: - cont dalfampridine as per pt request - he is unsure if it's helpful - PT - physiatry referral for consideration of walking aids, ?afo - f/u with ortho for lumbar spondylosis.  - see vascular for swollen feet and feeling of coldness in LEs.   RTC 3m, sooner prn

## 2024-08-28 NOTE — HISTORY OF PRESENT ILLNESS
[Home] : at home, [unfilled] , at the time of the visit. [Medical Office: ([facility])___] : at the medical office located in  [Verbal consent obtained from patient] : the patient, [unfilled] [FreeTextEntry1] : Initial hx 10/2019 5-6y ago - began to feel "fatigued", slow deterioration in energy level. legs have become weaker gradually over the years.  3-4y of numbness on the L hand starting in pinky and progressing to involve most of the hand. difficulty buttoning buttons. gradual over time. No abrupt changes. Saw his PMD and was sent to multiple specialists - cardiology, etc. 7/2019 - had a "bad day at work", high stress, lost temper, feeling "funny" with increased fatigue and word finding difficulty, tingling on the left chin, no headaches. 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 Saw a neurosurgeon who sent pt for a C spine which showed lesions. Saw Jorge Robbins given lesions in the cord. Was eventually referred to Dr. Colon who recommended against surgery.  No prior episodes of neurological dysfunction. 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 Started ampyra in early 2020, notes some benefit. Has been on wahls protocol for several months in 2020. 9/2020 - Had R hip burning (where pocket would be), lasted 1-2d. L hand more numb than it was 1y ago. Continued worsening overall in 2020. 11/2020 - Walks 1/2 block before RLE gets tired, but has been able to walk 1/5 mile in early 2020. Was lost to follow up from 11/2020 to 5/2022. Had been briefly on modafinil 100mg prn but didn't help.   Subj interval:  Has had low back pain discomfort (without kathy pain) and some sciatica symptoms down LLE for some time. Saw ortho, got MRI L spine showing degenerative disc disease. Has been doing PT.   Pt feels like he has worsened over past year with respect to BL LE weakness.   Continues dalfampridine since early 2020, noted some benefit but now he is not sure.  Some SOB; I advised him to discuss with PMD and possibly pulm, and that this is not an MS symptoms.  Around the house, walks on his own. Otherwise holds on to his wife or using a cart while food shopping.  Has not been exercising on his own.   ROS/Current Sx: no BB dysfunction. gait dysfunction BL LE weakness  PMHX: HTN HLD benign brain tumor MS high hemoglobin - s/p pheresis, sees hematologist.  MEDS: bystolic ivermectin weekly quercetin ampyra vitD3 5ku   FHx: no neuro, no AI. daughter with unclear neurological dz for which she takes malaria med.   O:  Exam:  AO3. Normally conversant. Follows commands, names, and repeats. Good attention.  PERRL, VFF, EOMI, no nystagmus, face symmetric, TUP at midline.  Motor:   R: L: Del 5 5 Bi 5 5 Tri 5 5 Wrist Extensors 5 5 Finger abductors 5 4+ Finger extensors  5   4+  5 5-   HF 4+ 2+ KE 5 5 KF 5- 4 DF 5 4 (decROM) PF 5 5  Tone R L UE 0 0  LE 0 0  Sensory RUE LUE RLE LLE  LT + + + + Vib + + mod mod JPS + + + + PP + dec in all fingers of L hand + + Temp + + + +  Reflexes:  R L  Biceps 2 2 BR 2 2 Triceps 2 2 Pat 3+ 3+ AJ 3+ 3+  TOES E E  Coordination:  R L  FTN 0 0  KETURAH 0 0 HTS 0 0   Other    Gait: moderate L circumduction, somewhat unstable.    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 brain notable for typical appearing L superior frontal KELECHI lesion, large R parietal curvilinear KELECHI lesion, and at least one typical appearing PV lesion. multiple typical appearing t2h in cord. no aleyda+ lesions.  MRI brain 4/2022 - stable on my review. no new lesions. tumor is stable.   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 Definite progression as of 4/2023 and 5/2024.   Advised ocrevus use, discussed pros and cons.  Discussed full benefit risk profile of ocreuvs, including risk of infusion/injection reactions, infectious, rare serious infections, possible neoplastic risk, possible effects on vaccine response, blood effects.  Discussed dx and treatment plan at length with pt and wife. Education provided. All questions answered.   Dz mod: - pt wants to start ocrevus - cont vitD3, check level - check new blood work prior to ocrevus - defer MRIs for now given not likely to . will plan to get new MRIs after starting ocrevus  Sx: - cont dalfampridine as per pt request - he is unsure if it's helpful - PT - physiatry referral for consideration of walking aids, ?afo - f/u with ortho for lumbar spondylosis.  - see vascular for swollen feet and feeling of coldness in LEs.   RTC 3m, sooner prn

## 2024-09-09 ENCOUNTER — APPOINTMENT (OUTPATIENT)
Dept: NEUROLOGY | Facility: CLINIC | Age: 69
End: 2024-09-09

## 2024-09-18 ENCOUNTER — RX RENEWAL (OUTPATIENT)
Age: 69
End: 2024-09-18

## 2024-10-01 ENCOUNTER — APPOINTMENT (OUTPATIENT)
Dept: PHYSICAL MEDICINE AND REHAB | Facility: CLINIC | Age: 69
End: 2024-10-01
Payer: MEDICARE

## 2024-10-01 VITALS
OXYGEN SATURATION: 98 % | SYSTOLIC BLOOD PRESSURE: 159 MMHG | HEART RATE: 55 BPM | TEMPERATURE: 98.1 F | RESPIRATION RATE: 16 BRPM | DIASTOLIC BLOOD PRESSURE: 83 MMHG

## 2024-10-01 DIAGNOSIS — G35 MULTIPLE SCLEROSIS: ICD-10-CM

## 2024-10-01 PROCEDURE — 99203 OFFICE O/P NEW LOW 30 MIN: CPT

## 2024-10-01 NOTE — HISTORY OF PRESENT ILLNESS
[FreeTextEntry1] : CLAUDINE is a 69 year M who presents for initial visit for lower extremity weakness.  Patient was diagnosed with MS 5 years old and follows with Dr. Pearce.  He plans to start Ocrevus in the next 3 weeks.  He is currently in PT/OT [facility] [place].  He reports his symptoms/deficits include left hand numbness, lower extremity weakness (L>>R) and generalize fatigue.  As of the last 6 months he has been ambulating with a straight cane and occasionally using a RW within the home.  He reports full body spasms when getting out bed in the morning.  Spasms are short lived and not impacting his daily function/transfers

## 2024-10-01 NOTE — REASON FOR VISIT
[Initial Evaluation] : an initial evaluation [Spouse] : spouse [FreeTextEntry1] : lower extremity weakness

## 2024-10-01 NOTE — PHYSICAL EXAM
[FreeTextEntry1] : GEN: AAOx3, NAD. PSYCH: Normal mood and affect. Responds appropriately to commands. EYES: Sclerae Anicteric. No discharge. EOMI. RESP: Breathing unlabored. CV: warm and well perfused EXT: No C/C/E. SKIN: No lesions noted. STRENGTH: 5/5 bilaterally in the UEs; Right 5/5 HF, 5/5 KE, 5/5 DF, 5/5 PF; Left 3/5 HF, 4/5 KE, 4/5 DF, 5/5 PF TONE: Normal, No clonus. REFLEXES: 2+ symmetric throughout LEs SENS: Grossly intact to light touch bilateral lower extremities; +numbness of doshi aspect of left hand GAIT: mild outoeing and inconsistent clearance of toes with swing phase LUMBAR: Slump negative bilaterally

## 2024-10-01 NOTE — ASSESSMENT
[FreeTextEntry1] : CLAUDINE is a 69 year M who presents for initial visit for lower extremity weakness in the setting of MS  Plan 1. Physical therapy prescription provided with focus on hip flexor strengthening, knee extensor strengthening, core stabilization, gait and electrical stim 2. Recommend discussing possible use of amantadine with Dr. Pearce to help with generalized fatigue 3. RTC in 6 months or sooner if needed

## 2024-10-01 NOTE — SOCIAL HISTORY
[Spouse/Partner] : spouse/partner [Private Home] : in a private home [Cane] : ~T cane [Walker] : walker [Stairs inside the home?] : no stairs inside the home [FreeTextEntry1] : [place] style home

## 2024-10-01 NOTE — REVIEW OF SYSTEMS
[Negative] : Heme/Lymph [FreeTextEntry9] : Low back pain previously worked up and has mild stenosis [de-identified] : See HPI

## 2024-10-10 RX ORDER — AMANTADINE HYDROCHLORIDE 100 1/1
100 TABLET ORAL
Qty: 60 | Refills: 3 | Status: ACTIVE | COMMUNITY
Start: 2024-10-10 | End: 1900-01-01

## 2024-11-11 ENCOUNTER — APPOINTMENT (OUTPATIENT)
Dept: NEUROLOGY | Facility: CLINIC | Age: 69
End: 2024-11-11
Payer: MEDICARE

## 2024-11-11 PROCEDURE — G2211 COMPLEX E/M VISIT ADD ON: CPT

## 2024-11-11 PROCEDURE — 99215 OFFICE O/P EST HI 40 MIN: CPT

## 2024-12-09 ENCOUNTER — APPOINTMENT (OUTPATIENT)
Dept: NEUROLOGY | Facility: CLINIC | Age: 69
End: 2024-12-09

## 2025-01-14 ENCOUNTER — APPOINTMENT (OUTPATIENT)
Dept: NEUROLOGY | Facility: CLINIC | Age: 70
End: 2025-01-14

## 2025-01-14 PROCEDURE — 96415 CHEMO IV INFUSION ADDL HR: CPT

## 2025-01-14 PROCEDURE — 96413 CHEMO IV INFUSION 1 HR: CPT

## 2025-01-27 ENCOUNTER — RX RENEWAL (OUTPATIENT)
Age: 70
End: 2025-01-27

## 2025-01-28 ENCOUNTER — APPOINTMENT (OUTPATIENT)
Dept: NEUROLOGY | Facility: CLINIC | Age: 70
End: 2025-01-28

## 2025-01-28 PROCEDURE — 96413 CHEMO IV INFUSION 1 HR: CPT

## 2025-01-28 PROCEDURE — 96415 CHEMO IV INFUSION ADDL HR: CPT

## 2025-04-01 ENCOUNTER — APPOINTMENT (OUTPATIENT)
Dept: PHYSICAL MEDICINE AND REHAB | Facility: CLINIC | Age: 70
End: 2025-04-01

## 2025-04-28 ENCOUNTER — APPOINTMENT (OUTPATIENT)
Dept: NEUROLOGY | Facility: CLINIC | Age: 70
End: 2025-04-28
Payer: MEDICARE

## 2025-04-28 VITALS
SYSTOLIC BLOOD PRESSURE: 160 MMHG | BODY MASS INDEX: 28.23 KG/M2 | DIASTOLIC BLOOD PRESSURE: 90 MMHG | WEIGHT: 220 LBS | HEIGHT: 74 IN | HEART RATE: 61 BPM

## 2025-04-28 PROCEDURE — G2212 PROLONG OUTPT/OFFICE VIS: CPT

## 2025-04-28 PROCEDURE — G2211 COMPLEX E/M VISIT ADD ON: CPT

## 2025-04-28 PROCEDURE — 99215 OFFICE O/P EST HI 40 MIN: CPT

## 2025-04-29 RX ORDER — NALTREXONE HCL 1.5 MG
1.5 CAPSULE ORAL
Qty: 90 | Refills: 5 | Status: ACTIVE | COMMUNITY
Start: 2025-04-29 | End: 1900-01-01

## 2025-07-29 ENCOUNTER — APPOINTMENT (OUTPATIENT)
Dept: NEUROLOGY | Facility: CLINIC | Age: 70
End: 2025-07-29

## 2025-08-18 ENCOUNTER — APPOINTMENT (OUTPATIENT)
Dept: NEUROLOGY | Facility: CLINIC | Age: 70
End: 2025-08-18

## 2025-08-25 ENCOUNTER — APPOINTMENT (OUTPATIENT)
Dept: NEUROLOGY | Facility: CLINIC | Age: 70
End: 2025-08-25
Payer: MEDICARE

## 2025-08-25 PROCEDURE — 96415 CHEMO IV INFUSION ADDL HR: CPT

## 2025-08-25 PROCEDURE — 96413 CHEMO IV INFUSION 1 HR: CPT

## 2025-08-27 ENCOUNTER — RX RENEWAL (OUTPATIENT)
Age: 70
End: 2025-08-27

## 2025-08-27 RX ORDER — HYDROCHLOROTHIAZIDE 12.5 MG/1
12.5 CAPSULE ORAL DAILY
Qty: 90 | Refills: 0 | Status: ACTIVE | COMMUNITY
Start: 2025-08-26 | End: 1900-01-01